# Patient Record
Sex: FEMALE | Race: WHITE | Employment: OTHER | URBAN - METROPOLITAN AREA
[De-identification: names, ages, dates, MRNs, and addresses within clinical notes are randomized per-mention and may not be internally consistent; named-entity substitution may affect disease eponyms.]

---

## 2017-04-11 ENCOUNTER — OFFICE VISIT (OUTPATIENT)
Dept: OBGYN CLINIC | Facility: CLINIC | Age: 22
End: 2017-04-11

## 2017-04-11 VITALS
RESPIRATION RATE: 16 BRPM | HEART RATE: 88 BPM | WEIGHT: 114 LBS | HEIGHT: 63 IN | BODY MASS INDEX: 20.2 KG/M2 | DIASTOLIC BLOOD PRESSURE: 74 MMHG | SYSTOLIC BLOOD PRESSURE: 108 MMHG

## 2017-04-11 DIAGNOSIS — N76.0 VAGINITIS AND VULVOVAGINITIS: Primary | ICD-10-CM

## 2017-04-11 DIAGNOSIS — Z11.3 SCREEN FOR STD (SEXUALLY TRANSMITTED DISEASE): ICD-10-CM

## 2017-04-11 DIAGNOSIS — Z30.09 EVALUATION REGARDING CONTRACEPTION OPTIONS: ICD-10-CM

## 2017-04-11 PROCEDURE — 87591 N.GONORRHOEAE DNA AMP PROB: CPT | Performed by: OBSTETRICS & GYNECOLOGY

## 2017-04-11 PROCEDURE — 99203 OFFICE O/P NEW LOW 30 MIN: CPT | Performed by: OBSTETRICS & GYNECOLOGY

## 2017-04-11 PROCEDURE — 87660 TRICHOMONAS VAGIN DIR PROBE: CPT | Performed by: OBSTETRICS & GYNECOLOGY

## 2017-04-11 PROCEDURE — 87510 GARDNER VAG DNA DIR PROBE: CPT | Performed by: OBSTETRICS & GYNECOLOGY

## 2017-04-11 PROCEDURE — 87491 CHLMYD TRACH DNA AMP PROBE: CPT | Performed by: OBSTETRICS & GYNECOLOGY

## 2017-04-11 PROCEDURE — 87480 CANDIDA DNA DIR PROBE: CPT | Performed by: OBSTETRICS & GYNECOLOGY

## 2017-04-11 RX ORDER — METRONIDAZOLE 10 MG/G
GEL TOPICAL DAILY
COMMUNITY
End: 2017-08-25 | Stop reason: ALTCHOICE

## 2017-04-11 NOTE — PROGRESS NOTES
Fatou Saleh is a 22year old female  Patient's last menstrual period was 04/10/2017 (exact date). Patient presents with:  Vaginal Problem: new patient. Reoccuring discharge/vaginal bacteria. Burning and pain/redness.    .   Tx,s with metrogel and cyanosis  Psychiatric:  Oriented to time, place, person and situation.  Appropriate mood and affect    Pelvic Exam:  External Genitalia: normal appearance, hair distribution, and no lesions  Urethral Meatus:  normal in size, location, without lesions and pr

## 2017-04-13 RX ORDER — METRONIDAZOLE 7.5 MG/G
1 GEL VAGINAL NIGHTLY
Qty: 70 G | Refills: 0 | Status: SHIPPED | OUTPATIENT
Start: 2017-04-13 | End: 2017-04-18

## 2017-04-13 NOTE — TELEPHONE ENCOUNTER
----- Message from Tremaine Salinas MD sent at 4/13/2017 11:15 AM CDT -----  Results reviewed.  Please inform patient  metrogel or flagyl

## 2017-04-20 ENCOUNTER — OFFICE VISIT (OUTPATIENT)
Dept: FAMILY MEDICINE CLINIC | Facility: CLINIC | Age: 22
End: 2017-04-20

## 2017-04-20 ENCOUNTER — LAB ENCOUNTER (OUTPATIENT)
Dept: LAB | Age: 22
End: 2017-04-20
Attending: EMERGENCY MEDICINE
Payer: COMMERCIAL

## 2017-04-20 VITALS
RESPIRATION RATE: 14 BRPM | HEIGHT: 62.5 IN | DIASTOLIC BLOOD PRESSURE: 78 MMHG | TEMPERATURE: 99 F | SYSTOLIC BLOOD PRESSURE: 102 MMHG | OXYGEN SATURATION: 99 % | HEART RATE: 92 BPM | BODY MASS INDEX: 19.56 KG/M2 | WEIGHT: 109 LBS

## 2017-04-20 DIAGNOSIS — D64.9 ANEMIA, UNSPECIFIED TYPE: ICD-10-CM

## 2017-04-20 DIAGNOSIS — Z13.228 SCREENING FOR ENDOCRINE, NUTRITIONAL, METABOLIC AND IMMUNITY DISORDER: ICD-10-CM

## 2017-04-20 DIAGNOSIS — Z13.21 SCREENING FOR ENDOCRINE, NUTRITIONAL, METABOLIC AND IMMUNITY DISORDER: ICD-10-CM

## 2017-04-20 DIAGNOSIS — Z13.0 SCREENING FOR ENDOCRINE, NUTRITIONAL, METABOLIC AND IMMUNITY DISORDER: ICD-10-CM

## 2017-04-20 DIAGNOSIS — Z13.29 SCREENING FOR ENDOCRINE, NUTRITIONAL, METABOLIC AND IMMUNITY DISORDER: ICD-10-CM

## 2017-04-20 DIAGNOSIS — J02.0 STREP PHARYNGITIS: Primary | ICD-10-CM

## 2017-04-20 PROCEDURE — 80053 COMPREHEN METABOLIC PANEL: CPT

## 2017-04-20 PROCEDURE — 36415 COLL VENOUS BLD VENIPUNCTURE: CPT

## 2017-04-20 PROCEDURE — 82306 VITAMIN D 25 HYDROXY: CPT

## 2017-04-20 PROCEDURE — 87880 STREP A ASSAY W/OPTIC: CPT | Performed by: EMERGENCY MEDICINE

## 2017-04-20 PROCEDURE — 83540 ASSAY OF IRON: CPT

## 2017-04-20 PROCEDURE — 80061 LIPID PANEL: CPT

## 2017-04-20 PROCEDURE — 99203 OFFICE O/P NEW LOW 30 MIN: CPT | Performed by: EMERGENCY MEDICINE

## 2017-04-20 PROCEDURE — 84443 ASSAY THYROID STIM HORMONE: CPT

## 2017-04-20 PROCEDURE — 82728 ASSAY OF FERRITIN: CPT

## 2017-04-20 PROCEDURE — 83550 IRON BINDING TEST: CPT

## 2017-04-20 PROCEDURE — 85025 COMPLETE CBC W/AUTO DIFF WBC: CPT

## 2017-04-20 RX ORDER — AMOXICILLIN 875 MG/1
875 TABLET, COATED ORAL 2 TIMES DAILY
Qty: 20 TABLET | Refills: 0 | Status: SHIPPED | OUTPATIENT
Start: 2017-04-20 | End: 2017-04-30

## 2017-04-20 NOTE — PROGRESS NOTES
Patient presents with:  Sinus Problem: Sinus pressure/congestion, body aches, weakness, fatigue, sore throat, productive cough, and bilateral ear pain. x10 days   Diabetes: Pt would also like to discuss family hx dm.  Pt c/o ongoing frequent urination, freq inspiratory and expiratory effort    MDM/Assessment/Plan:   Orders for this encounter:      Orders Placed This Encounter  CBC With Diff  CMP  Lipid  TSH W Reflex To Free T4  Vitamin D, 25-Hydroxy  Rapid Strep  amoxicillin 875 MG Oral Tab   Sig: Take 1 tabl

## 2017-04-20 NOTE — PATIENT INSTRUCTIONS
Thank you for choosing [facility]  To Do:  FOR SHANIA RAMOS  Salt water Gargles. Soft diet. Push fluids and rest. Throw away toothbrush after 2-3 days of antibiotics.   He may take over-the-counter Tylenol or Motrin for fever body aches and ch okay. Avoid salty or spicy foods. Follow-up care  Follow up with your healthcare provider or our staff if you are not improving over the next week.   When to seek medical advice  Call your healthcare provider right away if any of these occur:  · Fever as d

## 2017-04-21 PROBLEM — D64.9 ANEMIA: Status: ACTIVE | Noted: 2017-04-21

## 2017-04-25 ENCOUNTER — TELEPHONE (OUTPATIENT)
Dept: FAMILY MEDICINE CLINIC | Facility: CLINIC | Age: 22
End: 2017-04-25

## 2017-04-25 NOTE — TELEPHONE ENCOUNTER
----- Message from Fernando Regan MD sent at 4/21/2017 12:33 PM CDT -----  + anemia will reflex TIBC and Ferritin      Low vitamin D, needs vitamin D 50,000 units q weekly #4 with 2 refills then, OTC VIT D 2761-4045 units q daily maintenance   Repeat Vi

## 2017-04-25 NOTE — TELEPHONE ENCOUNTER
----- Message from Sarah Damon MD sent at 4/24/2017 10:17 AM CDT -----  + iron deficiency anemia  Labs consistent with iron deficiency anemia. Pls advise patient to take OTC Ferrous sulfate 325 mg 2-3 times a day.  Anticipate constipation and stomach

## 2017-05-05 ENCOUNTER — OFFICE VISIT (OUTPATIENT)
Dept: FAMILY MEDICINE CLINIC | Facility: CLINIC | Age: 22
End: 2017-05-05

## 2017-05-05 VITALS
RESPIRATION RATE: 16 BRPM | BODY MASS INDEX: 19.56 KG/M2 | TEMPERATURE: 99 F | DIASTOLIC BLOOD PRESSURE: 70 MMHG | OXYGEN SATURATION: 98 % | HEART RATE: 101 BPM | SYSTOLIC BLOOD PRESSURE: 120 MMHG | HEIGHT: 62.5 IN | WEIGHT: 109 LBS

## 2017-05-05 DIAGNOSIS — R10.31 RLQ ABDOMINAL PAIN: Primary | ICD-10-CM

## 2017-05-05 DIAGNOSIS — N30.01 ACUTE CYSTITIS WITH HEMATURIA: ICD-10-CM

## 2017-05-05 DIAGNOSIS — D50.9 IRON DEFICIENCY ANEMIA, UNSPECIFIED IRON DEFICIENCY ANEMIA TYPE: ICD-10-CM

## 2017-05-05 DIAGNOSIS — N90.9 LESION OF FEMALE PERINEUM: ICD-10-CM

## 2017-05-05 DIAGNOSIS — R10.2 VAGINAL PAIN: ICD-10-CM

## 2017-05-05 DIAGNOSIS — E55.9 VITAMIN D DEFICIENCY: ICD-10-CM

## 2017-05-05 PROCEDURE — 81025 URINE PREGNANCY TEST: CPT | Performed by: EMERGENCY MEDICINE

## 2017-05-05 PROCEDURE — 87529 HSV DNA AMP PROBE: CPT | Performed by: EMERGENCY MEDICINE

## 2017-05-05 PROCEDURE — 87086 URINE CULTURE/COLONY COUNT: CPT | Performed by: EMERGENCY MEDICINE

## 2017-05-05 PROCEDURE — 99214 OFFICE O/P EST MOD 30 MIN: CPT | Performed by: EMERGENCY MEDICINE

## 2017-05-05 PROCEDURE — 87186 SC STD MICRODIL/AGAR DIL: CPT | Performed by: EMERGENCY MEDICINE

## 2017-05-05 PROCEDURE — 81003 URINALYSIS AUTO W/O SCOPE: CPT | Performed by: EMERGENCY MEDICINE

## 2017-05-05 PROCEDURE — 87077 CULTURE AEROBIC IDENTIFY: CPT | Performed by: EMERGENCY MEDICINE

## 2017-05-05 RX ORDER — VALACYCLOVIR HYDROCHLORIDE 1 G/1
1 TABLET, FILM COATED ORAL 3 TIMES DAILY
Qty: 21 TABLET | Refills: 0 | Status: SHIPPED | OUTPATIENT
Start: 2017-05-05 | End: 2017-05-12

## 2017-05-05 RX ORDER — CIPROFLOXACIN 500 MG/1
500 TABLET, FILM COATED ORAL 2 TIMES DAILY
Qty: 14 TABLET | Refills: 0 | Status: SHIPPED | OUTPATIENT
Start: 2017-05-05 | End: 2017-05-12

## 2017-05-05 RX ORDER — ERGOCALCIFEROL 1.25 MG/1
50000 CAPSULE ORAL WEEKLY
Qty: 12 CAPSULE | Refills: 0 | Status: SHIPPED | OUTPATIENT
Start: 2017-05-05 | End: 2017-06-04

## 2017-05-05 NOTE — PATIENT INSTRUCTIONS
Thank you for choosing [facility]  To Do:  FOR SHANIA Nance  1. Continue with Metrogel for now  2. Take antibiotic, Ciprofloxacin as directed  3. You are anemic, take OTC Ferrous Sulfate 2-3 times a day  4.  Take high dose vit D 5000 high altitudes. Follow-up care  Follow up with your healthcare provider in 2 months, or as advised. This is to have another red blood cell count to be sure your anemia has been fixed.   When to seek medical advice  Call your healthcare provider right away  thin the blood and may increase bleeding. Follow-up care  Follow up with your healthcare provider, or as advised. If you were injured and had blood in your urine, you should have a repeat urine test in 1 to 2 days.  Contact your doctor for this test.  A ra

## 2017-05-05 NOTE — PROGRESS NOTES
Chief Complaint:   Patient presents with:  Vaginal Discharge: bv for over a year, blood in urine on tuesday, vaginal pain, appendix pain, lab results    HPI:   This is a 25year old female     BACTERIAL VAGINOSIS  Been treated repeatedly for BV.  Treated wi the following:    Height as of this encounter: 62.5\". Weight as of this encounter: 109 lb. Vital signs reviewed. Appears stated age, well groomed.   GENERAL: well developed, well nourished, well hydrated, no distress  SKIN: good skin turgor, no obvious [id number] Numeric   Multistix Expiration Date 3/31/2018 Date       ASSESSMENT AND PLAN:   Diagnoses and all orders for this visit:    RLQ abdominal pain  -     Urine Preg Test  -     US PELVIS EV (TRNS ABD AND ENDOVAG) ([id number]);  Future  -     US APPE

## 2017-05-06 ENCOUNTER — HOSPITAL ENCOUNTER (OUTPATIENT)
Dept: ULTRASOUND IMAGING | Age: 22
Discharge: HOME | End: 2017-05-06
Attending: EMERGENCY MEDICINE
Payer: COMMERCIAL

## 2017-05-06 DIAGNOSIS — R10.31 RLQ ABDOMINAL PAIN: ICD-10-CM

## 2017-05-06 DIAGNOSIS — N30.01 ACUTE CYSTITIS WITH HEMATURIA: ICD-10-CM

## 2017-05-06 PROCEDURE — 76856 US EXAM PELVIC COMPLETE: CPT | Performed by: EMERGENCY MEDICINE

## 2017-05-06 PROCEDURE — 76705 ECHO EXAM OF ABDOMEN: CPT | Performed by: EMERGENCY MEDICINE

## 2017-05-06 PROCEDURE — 76830 TRANSVAGINAL US NON-OB: CPT | Performed by: EMERGENCY MEDICINE

## 2017-05-08 ENCOUNTER — TELEPHONE (OUTPATIENT)
Dept: FAMILY MEDICINE CLINIC | Facility: CLINIC | Age: 22
End: 2017-05-08

## 2017-05-08 NOTE — TELEPHONE ENCOUNTER
I [id number] (M) and discussed results and recommendations at length with patient. Pt states the pain in the right lower quadrant in the same. Pain is 3\" from the belly button. It came and went over the weekend. Pain is a 6 1/2-8 1/2.   Pt state

## 2017-05-08 NOTE — TELEPHONE ENCOUNTER
----- Message from Manny Escobar MD sent at 5/6/2017  4:16 PM CDT -----  Appendix not identified  pls call pt for condition update

## 2017-05-08 NOTE — TELEPHONE ENCOUNTER
Patient returned call she also needs a note for work patient can not take calls during the day she is hoping to get her results when she comes to pick the note up at 6:30.  Leave detailed message on VM

## 2017-05-10 ENCOUNTER — TELEPHONE (OUTPATIENT)
Dept: FAMILY MEDICINE CLINIC | Facility: CLINIC | Age: 22
End: 2017-05-10

## 2017-05-10 NOTE — TELEPHONE ENCOUNTER
Called patient and spoke with her. Advised patient of results and POC below. Patient states understanding. Please advise if patient should continue Valtrex since HSV is negative. Thank you!     Triage: patient is going to continue with valtrex until oth

## 2017-05-10 NOTE — TELEPHONE ENCOUNTER
----- Message from Patricia Yi MD sent at 5/9/2017 11:51 AM CDT -----  NO evidence for HSV    + UTI sensitive to Cipro, continue with Cipro

## 2017-05-11 NOTE — TELEPHONE ENCOUNTER
Called patient and spoke with her. Advised patient to continue with her valtrex per below. All questions answered for patient and she expressed understanding.

## 2017-08-25 ENCOUNTER — OFFICE VISIT (OUTPATIENT)
Dept: FAMILY MEDICINE CLINIC | Facility: CLINIC | Age: 22
End: 2017-08-25

## 2017-08-25 VITALS
WEIGHT: 109 LBS | HEIGHT: 61.5 IN | BODY MASS INDEX: 20.32 KG/M2 | RESPIRATION RATE: 16 BRPM | TEMPERATURE: 99 F | OXYGEN SATURATION: 99 % | SYSTOLIC BLOOD PRESSURE: 112 MMHG | HEART RATE: 82 BPM | DIASTOLIC BLOOD PRESSURE: 80 MMHG

## 2017-08-25 DIAGNOSIS — J02.9 SORE THROAT: ICD-10-CM

## 2017-08-25 DIAGNOSIS — J01.10 ACUTE NON-RECURRENT FRONTAL SINUSITIS: Primary | ICD-10-CM

## 2017-08-25 LAB — CONTROL LINE PRESENT WITH A CLEAR BACKGROUND (YES/NO): YES YES/NO

## 2017-08-25 PROCEDURE — 87081 CULTURE SCREEN ONLY: CPT | Performed by: EMERGENCY MEDICINE

## 2017-08-25 PROCEDURE — 87880 STREP A ASSAY W/OPTIC: CPT | Performed by: EMERGENCY MEDICINE

## 2017-08-25 PROCEDURE — 99214 OFFICE O/P EST MOD 30 MIN: CPT | Performed by: EMERGENCY MEDICINE

## 2017-08-25 RX ORDER — AMOXICILLIN 875 MG/1
875 TABLET, COATED ORAL 2 TIMES DAILY
Qty: 20 TABLET | Refills: 0 | Status: SHIPPED | OUTPATIENT
Start: 2017-08-25 | End: 2017-09-04

## 2017-08-25 RX ORDER — FLUTICASONE PROPIONATE 50 MCG
2 SPRAY, SUSPENSION (ML) NASAL DAILY
Qty: 3 BOTTLE | Refills: 3 | Status: SHIPPED | OUTPATIENT
Start: 2017-08-25 | End: 2018-08-20

## 2017-08-25 NOTE — PROGRESS NOTES
Chief Complaint:   Patient presents with:  Sore Throat: sinus pressure/congestion, bilateral ear pain, sore throat x2 days     HPI:   This is a 25year old female     C/O Sore throat and congestion for the past 2 days. + frontal headache and fatigue.  Still voice.  SKIN: good skin turgor, no obvious rashes  HEENT: atraumatic, normocephalic, ears, nose and throat are clear bilateral maxillary frontal sinuses tender to palpation. Nasal turbinates edematous and boggy with clear nasal discharge.   EYES: sclera no

## 2017-08-25 NOTE — PATIENT INSTRUCTIONS
Thank you for choosing [address] [place] [address] [facility]  To Do:  FOR SHANIA An  1. Ff up in 2 weeks for annual physical and PAP  2. Take antibiotics s directed  3. Nasal rinses with OTC Netti pot   4.  May continue with OTC Cough supressants as needed help. (NOTE: Persons with high blood pressure should not use decongestants.  They can raise blood pressure.)  · Over-the-counter antihistamines may help if allergies contributed to your sinusitis.    · Do not use nasal rinses or irrigation during an acute s can irritate the mucosa. It can then swell up. As a response to irritation, the mucosa makes more mucus and other fluids. Tiny hairlike cilia cover the mucosa. Cilia help carry mucus toward the opening of the sinus.  Too much mucus may cause the cilia to st

## 2017-11-02 ENCOUNTER — APPOINTMENT (OUTPATIENT)
Dept: LAB | Age: 22
End: 2017-11-02
Attending: EMERGENCY MEDICINE
Payer: COMMERCIAL

## 2017-11-02 ENCOUNTER — OFFICE VISIT (OUTPATIENT)
Dept: FAMILY MEDICINE CLINIC | Facility: CLINIC | Age: 22
End: 2017-11-02

## 2017-11-02 VITALS
DIASTOLIC BLOOD PRESSURE: 66 MMHG | BODY MASS INDEX: 19.67 KG/M2 | RESPIRATION RATE: 16 BRPM | HEIGHT: 63 IN | WEIGHT: 111 LBS | OXYGEN SATURATION: 98 % | TEMPERATURE: 99 F | SYSTOLIC BLOOD PRESSURE: 104 MMHG | HEART RATE: 90 BPM

## 2017-11-02 DIAGNOSIS — J02.9 SORE THROAT: ICD-10-CM

## 2017-11-02 DIAGNOSIS — Z20.2 STD EXPOSURE: ICD-10-CM

## 2017-11-02 DIAGNOSIS — Z20.2 STD EXPOSURE: Primary | ICD-10-CM

## 2017-11-02 PROCEDURE — 87591 N.GONORRHOEAE DNA AMP PROB: CPT | Performed by: EMERGENCY MEDICINE

## 2017-11-02 PROCEDURE — 87086 URINE CULTURE/COLONY COUNT: CPT | Performed by: EMERGENCY MEDICINE

## 2017-11-02 PROCEDURE — 99214 OFFICE O/P EST MOD 30 MIN: CPT | Performed by: EMERGENCY MEDICINE

## 2017-11-02 PROCEDURE — 87081 CULTURE SCREEN ONLY: CPT | Performed by: EMERGENCY MEDICINE

## 2017-11-02 PROCEDURE — 86803 HEPATITIS C AB TEST: CPT

## 2017-11-02 PROCEDURE — 87491 CHLMYD TRACH DNA AMP PROBE: CPT | Performed by: EMERGENCY MEDICINE

## 2017-11-02 PROCEDURE — 86780 TREPONEMA PALLIDUM: CPT

## 2017-11-02 PROCEDURE — 87340 HEPATITIS B SURFACE AG IA: CPT

## 2017-11-02 PROCEDURE — 36415 COLL VENOUS BLD VENIPUNCTURE: CPT

## 2017-11-02 PROCEDURE — 86706 HEP B SURFACE ANTIBODY: CPT

## 2017-11-02 PROCEDURE — 87389 HIV-1 AG W/HIV-1&-2 AB AG IA: CPT

## 2017-11-02 PROCEDURE — 81025 URINE PREGNANCY TEST: CPT | Performed by: EMERGENCY MEDICINE

## 2017-11-02 PROCEDURE — 81003 URINALYSIS AUTO W/O SCOPE: CPT | Performed by: EMERGENCY MEDICINE

## 2017-11-02 RX ORDER — AZITHROMYCIN 500 MG/1
1000 TABLET, FILM COATED ORAL ONCE
Qty: 2 TABLET | Refills: 0 | Status: SHIPPED | OUTPATIENT
Start: 2017-11-02 | End: 2017-11-02

## 2017-11-02 NOTE — PROGRESS NOTES
Chief Complaint:   Patient presents with:  Chlamydia: Exposure to chlamydia    HPI:   This is a 25year old female       STD EXPOSURE  Recent partner has told her that he was recently diagnosed with chlamydia. Boyfriend treated.  No history of STD in the pa encounter: 111 lb. Vital signs reviewed. Appears stated age, well groomed.   GENERAL: well developed, well nourished, well hydrated, no distress  SKIN: good skin turgor, no obvious rashes  HEENT: atraumatic, normocephalic, ears, nose and throat are clear t ANTIBODY; Future  -     URINE CULTURE, ROUTINE; Future  -     CHLAMYDIA/GONOCOCCUS, RODOLFO; Future    Sore throat  Most likely URI. Advised supportive management. Most likely also secondary to postnasal drip. Advised to continue with Flonase nasal spray.   Kathy Lofton

## 2017-11-02 NOTE — PATIENT INSTRUCTIONS
Thank you for choosing [facility]  To Do:  FOR SHANIA Lujan  1. Continue with Flonase nose spray  2. MAy take an over the counter decongestant as needed  3. Follow up as neded  4. Take Azithromycin for chlamydia exposure  5.  Safe se provider may also order other tests because chlamydia symptoms can be confused with symptoms of other STDs. These STDs include:  · Gonorrhea   · HIV/AIDS  · Hepatitis B  · Trichomoniasis  · Syphilis  What do my test results mean?   Many things may affect yo you’re straight or noble, male or female, young or old. Any person who has sex can get an STD. Your risk increases if:  · You have more than one partner. The more partners you have, the greater your risk. · Your partner has other partners.  If your partner i rectum  Even if you don’t have symptoms  You may have an STD, even if you don’t have symptoms. If you think you are at risk, get checked. Go to a clinic or to your healthcare provider.  If your partner has an STD, you need to be tested too, even if you feel the STD first. So try not to place blame. Your healthcare provider may offer some advice on how to begin. Prevent future problems  Even after [facility] been treated, you can still be infected again. This is a common problem.  It happens when a partner passes sex. And choose your partner wisely. Use condoms for safer sex  If you have sex, latex condoms provide the best protection against STDs. Latex condoms stop the exchange of body fluids that carry certain STDs. They also limit contact with affected skin.  Be the past, and how many you have now. Find out if either of you has an STD. If you decide to have sex, use a condom each time.  Don’t stop using condoms unless you’re sure neither of you has other partners and Chris both been tested to confirm you don’t hav

## 2017-11-06 ENCOUNTER — TELEPHONE (OUTPATIENT)
Dept: FAMILY MEDICINE CLINIC | Facility: CLINIC | Age: 22
End: 2017-11-06

## 2017-11-06 NOTE — TELEPHONE ENCOUNTER
----- Message from Lowell Reese MD sent at 11/6/2017 12:01 PM CST -----  STD screening all neg  Pt is Hep B NON IMMUNE  Pls offer Hep B series if she wishes.

## 2017-11-07 NOTE — TELEPHONE ENCOUNTER
Pt notified of lab results & below orders. Pt notified of + Chlamydia & to take Azithromycin Rx that was given at [facility]. She states her partner has been treated already.  She will call office back to schedule nurse visit if she would like to get Hep B vacci

## 2017-11-07 NOTE — TELEPHONE ENCOUNTER
----- Message from Delroy Wells MD sent at 11/7/2017  1:57 PM CST -----  pls inform pt of + Chlamydia  Partner should be treated too  Pt Rx'd azithromycin from office

## 2018-07-27 ENCOUNTER — OFFICE VISIT (OUTPATIENT)
Dept: FAMILY MEDICINE CLINIC | Facility: CLINIC | Age: 23
End: 2018-07-27
Payer: COMMERCIAL

## 2018-07-27 VITALS
TEMPERATURE: 99 F | DIASTOLIC BLOOD PRESSURE: 74 MMHG | WEIGHT: 122 LBS | HEART RATE: 110 BPM | HEIGHT: 63 IN | SYSTOLIC BLOOD PRESSURE: 118 MMHG | RESPIRATION RATE: 16 BRPM | OXYGEN SATURATION: 99 % | BODY MASS INDEX: 21.62 KG/M2

## 2018-07-27 DIAGNOSIS — J06.9 VIRAL URI: Primary | ICD-10-CM

## 2018-07-27 PROCEDURE — 99213 OFFICE O/P EST LOW 20 MIN: CPT | Performed by: PHYSICIAN ASSISTANT

## 2018-07-27 NOTE — PROGRESS NOTES
CHIEF COMPLAINT:   Patient presents with:  Nasal Congestion    HPI:   Amita Conteh is a 26year old female who presents for upper and lower respiratory symptoms for  4 days.  Patient reports sinus pressure, swollen glands, PND, scratchy/ sore THROAT: Oral mucosa pink, moist. Posterior pharynx is mildly injected. No exudates. LUNGS: clear to auscultation bilaterally, no wheezes or rhonchi. Breathing is non labored. CARDIO: RRR without murmur. LYMPH: No lymphadenopathy.       ASSESSMENT AND PL · Your appetite may be poor, so a light diet is fine. Avoid dehydration by drinking 6 to 8 glasses of fluids per day (water, soft drinks, juices, tea, or soup). Extra fluids will help loosen secretions in the nose and lungs.   · Over-the-counter cold medici

## 2018-07-30 ENCOUNTER — TELEPHONE (OUTPATIENT)
Dept: FAMILY MEDICINE CLINIC | Facility: CLINIC | Age: 23
End: 2018-07-30

## 2018-07-30 RX ORDER — FLUTICASONE PROPIONATE 50 MCG
2 SPRAY, SUSPENSION (ML) NASAL DAILY
Qty: 1 BOTTLE | Refills: 0 | Status: SHIPPED | OUTPATIENT
Start: 2018-07-30 | End: 2019-01-14

## 2018-07-30 NOTE — TELEPHONE ENCOUNTER
Received call from pt. She states when she was seen at the [address] [place] [address] last week she told the provider she was out of her flonase. Provider stated she would send a prescription to her pharmacy. Pt states pharmacy never received prescription.  Reviewed note by [facility]

## 2018-11-06 ENCOUNTER — NURSE ONLY (OUTPATIENT)
Dept: FAMILY MEDICINE CLINIC | Facility: CLINIC | Age: 23
End: 2018-11-06
Payer: COMMERCIAL

## 2018-11-06 VITALS
RESPIRATION RATE: 18 BRPM | OXYGEN SATURATION: 98 % | HEART RATE: 82 BPM | HEIGHT: 63 IN | BODY MASS INDEX: 21.26 KG/M2 | DIASTOLIC BLOOD PRESSURE: 62 MMHG | TEMPERATURE: 98 F | SYSTOLIC BLOOD PRESSURE: 118 MMHG | WEIGHT: 120 LBS

## 2018-11-06 DIAGNOSIS — J30.9 ALLERGIC RHINITIS, UNSPECIFIED SEASONALITY, UNSPECIFIED TRIGGER: ICD-10-CM

## 2018-11-06 DIAGNOSIS — A08.4 VIRAL GASTROENTERITIS: Primary | ICD-10-CM

## 2018-11-06 PROCEDURE — 99213 OFFICE O/P EST LOW 20 MIN: CPT | Performed by: NURSE PRACTITIONER

## 2018-11-06 NOTE — PATIENT INSTRUCTIONS
Gatorade, fluids  Brat diet and simple foods  Rest  Flonase as packet insert  Zyrtec as packet insert     Follow-up if not improving       Viral Gastroenteritis (Adult)    Gastroenteritis is commonly called the stomach flu.  It is most often caused by a vir You may use acetaminophen or NSAID medicines like ibuprofen or naproxen to control fever unless another medicine was given. If you have chronic liver or kidney disease, talk with your healthcare provider before using these medicines.  Also talk with your pr · Limit fat intake to less than 15 grams per day. Do this by avoiding margarine, butter, oils, mayonnaise, sauces, gravies, fried foods, peanut butter, meat, poultry, and fish.   · Limit fiber and avoid raw or cooked vegetables, fresh fruits (except bananas Nasal allergies (also called allergic rhinitis) are a common health problem. They may be seasonal. This means they cause symptoms only at certain times of the year. Or they may be perennial. This means they cause symptoms all year long.  Other health proble Your healthcare provider will evaluate you to find the cause of your symptoms then recommend treatment. If your symptoms are due to nasal allergies, your healthcare provider may prescribe nasal steroid sprays or oral antihistamines to help reduce symptoms.

## 2018-11-06 NOTE — PROGRESS NOTES
CHIEF COMPLAINT:   Patient presents with:  Cough: cough at night, X 1 week, post nasal drip, X 2 months   Diarrhea: diarrhea, stomach pain X 2 days        HPI:   María Davis is a 26year old female who presents for cough for 1 week and post /62   Pulse 82   Temp 98.2 °F (36.8 °C) (Oral)   Resp 18   Ht 63\"   Wt 120 lb   LMP 10/16/2018   SpO2 98%   BMI 21.26 kg/m²   GENERAL: well developed, well nourished,in no apparent distress  SKIN: no rashes,no suspicious lesions  HEAD: atraumatic, n The patient is asked to f/u with PCP in one week if sx's persist or worsen.           *Meds & Refills for this Visit:  Requested Prescriptions      No prescriptions requested or ordered in this encounter           Patient Instructions     Gatorade, fluids · People with diarrhea should not prepare or serve food to others. When preparing foods, wash your hands before and after. · Wash your hands after using cutting boards, countertops, knives, or utensils that have been in contact with raw food.   · Keep unco · Desserts. Eat gelatin, popsicles, and fruit juice bars.   During the next 24 hours (the second day), you may add the following to the above:  · Hot cereal, plain toast, bread, rolls, and crackers  · Plain noodles, rice, mashed potatoes, chicken noodle or Date Last Reviewed: 1/3/2016  © 0805-8600 [employer] [address]. [address] [facility], [address] [place]. All rights reserved. This information is not intended as a substitute for professional medical care.  Always follow your healthcare professional' · Headache  It may not be allergies  Other health problems can cause symptoms like those of nasal allergies.  These include:  · Nonallergic rhinitis and viruses such as colds  · Irritants and pollutants, such as strong odors or smoke  · Certain medicines  ·

## 2019-01-14 ENCOUNTER — OFFICE VISIT (OUTPATIENT)
Dept: FAMILY MEDICINE CLINIC | Facility: CLINIC | Age: 24
End: 2019-01-14
Payer: COMMERCIAL

## 2019-01-14 VITALS
DIASTOLIC BLOOD PRESSURE: 70 MMHG | SYSTOLIC BLOOD PRESSURE: 120 MMHG | WEIGHT: 117 LBS | RESPIRATION RATE: 16 BRPM | HEART RATE: 92 BPM | TEMPERATURE: 99 F | HEIGHT: 63 IN | OXYGEN SATURATION: 98 % | BODY MASS INDEX: 20.73 KG/M2

## 2019-01-14 DIAGNOSIS — J01.11 ACUTE RECURRENT FRONTAL SINUSITIS: Primary | ICD-10-CM

## 2019-01-14 PROCEDURE — 99213 OFFICE O/P EST LOW 20 MIN: CPT | Performed by: NURSE PRACTITIONER

## 2019-01-14 RX ORDER — MONTELUKAST SODIUM 10 MG/1
10 TABLET ORAL NIGHTLY
Qty: 30 TABLET | Refills: 2 | Status: SHIPPED | OUTPATIENT
Start: 2019-01-14 | End: 2019-02-13

## 2019-01-14 RX ORDER — FLUTICASONE PROPIONATE 50 MCG
2 SPRAY, SUSPENSION (ML) NASAL DAILY
Qty: 3 BOTTLE | Refills: 3 | Status: SHIPPED | OUTPATIENT
Start: 2019-01-14 | End: 2020-01-09

## 2019-01-14 RX ORDER — DOXYCYCLINE HYCLATE 100 MG
100 TABLET ORAL 2 TIMES DAILY
Qty: 14 TABLET | Refills: 0 | Status: SHIPPED | OUTPATIENT
Start: 2019-01-14 | End: 2019-01-21

## 2019-01-14 RX ORDER — PREDNISONE 20 MG/1
40 TABLET ORAL DAILY
Qty: 10 TABLET | Refills: 0 | Status: SHIPPED | OUTPATIENT
Start: 2019-01-14 | End: 2019-01-19

## 2019-01-14 NOTE — PROGRESS NOTES
Caden Jade is a 26year old female. Patient presents with:  Cold: sore throat, sinus headaches x 1 weeks    HPI:    Sinus: Patient is 26 y female presents with sinus. Reports symptoms started  2 weeks ago .   Reports clogged  Ears, with Propionate 50 MCG/ACT Nasal Suspension; 2 sprays by Each Nare route daily. -     predniSONE 20 MG Oral Tab; Take 2 tablets (40 mg total) by mouth daily for 5 days. Increase fluids, rest, Tylenol or Ibuprofen prn, f/u in 5 days if not better.

## 2019-03-25 ENCOUNTER — OFFICE VISIT (OUTPATIENT)
Dept: FAMILY MEDICINE CLINIC | Facility: CLINIC | Age: 24
End: 2019-03-25
Payer: COMMERCIAL

## 2019-03-25 VITALS
HEART RATE: 76 BPM | RESPIRATION RATE: 16 BRPM | WEIGHT: 115.19 LBS | DIASTOLIC BLOOD PRESSURE: 72 MMHG | OXYGEN SATURATION: 99 % | HEIGHT: 63 IN | BODY MASS INDEX: 20.41 KG/M2 | SYSTOLIC BLOOD PRESSURE: 104 MMHG | TEMPERATURE: 99 F

## 2019-03-25 DIAGNOSIS — R39.9 UTI SYMPTOMS: Primary | ICD-10-CM

## 2019-03-25 LAB
APPEARANCE: CLEAR
MULTISTIX LOT#: NORMAL NUMERIC
PH, URINE: 5 (ref 4.5–8)
SPECIFIC GRAVITY: 1.03 (ref 1–1.03)
URINE-COLOR: YELLOW
UROBILINOGEN,SEMI-QN: 0.2 MG/DL (ref 0–1.9)

## 2019-03-25 PROCEDURE — 99213 OFFICE O/P EST LOW 20 MIN: CPT | Performed by: NURSE PRACTITIONER

## 2019-03-25 PROCEDURE — 81003 URINALYSIS AUTO W/O SCOPE: CPT | Performed by: NURSE PRACTITIONER

## 2019-03-25 PROCEDURE — 87086 URINE CULTURE/COLONY COUNT: CPT | Performed by: NURSE PRACTITIONER

## 2019-03-25 RX ORDER — NITROFURANTOIN 25; 75 MG/1; MG/1
100 CAPSULE ORAL 2 TIMES DAILY
Qty: 14 CAPSULE | Refills: 0 | Status: SHIPPED | OUTPATIENT
Start: 2019-03-25 | End: 2019-04-01

## 2019-03-25 NOTE — PROGRESS NOTES
CHIEF COMPLAINT:   Patient presents with:  Burning On Urination: x 1 week      HPI:   Arnaldo Mariee is a 26year old female who presents with symptoms of UTI. Complaining of urinary frequency, urgency, and dysuria for last 7 days.  Symptoms h CVAT.    Recent Results (from the past 24 hour(s))   URINALYSIS, AUTO, W/O SCOPE    Collection Time: 03/25/19  2:49 PM   Result Value Ref Range    Glucose Urine neg mg/dL    Bilirubin Urine neg Negative    Ketones, UA neg Negative mg/dL    Spec Gravity 1.0

## 2019-10-01 ENCOUNTER — OFFICE VISIT (OUTPATIENT)
Dept: FAMILY MEDICINE CLINIC | Facility: CLINIC | Age: 24
End: 2019-10-01
Payer: COMMERCIAL

## 2019-10-01 VITALS
HEIGHT: 63 IN | BODY MASS INDEX: 19.84 KG/M2 | OXYGEN SATURATION: 98 % | WEIGHT: 112 LBS | HEART RATE: 80 BPM | RESPIRATION RATE: 20 BRPM | SYSTOLIC BLOOD PRESSURE: 114 MMHG | DIASTOLIC BLOOD PRESSURE: 78 MMHG | TEMPERATURE: 99 F

## 2019-10-01 DIAGNOSIS — J06.9 VIRAL UPPER RESPIRATORY TRACT INFECTION: Primary | ICD-10-CM

## 2019-10-01 PROCEDURE — 99213 OFFICE O/P EST LOW 20 MIN: CPT | Performed by: NURSE PRACTITIONER

## 2019-10-01 NOTE — PROGRESS NOTES
CHIEF COMPLAINT:   Patient presents with:  Nasal Congestion: Headache, sinus pressure, runny nose, coughing up flem, post nasal drip, ears clogged, X 6 days   Note For Work: Needs note for work       HPI:   Ovi Monegasque is a 22year old femal or edema  LYMP: bilateral anterior cervical lymphadenopathy. ASSESSMENT AND PLAN:   Johanna Venegas is a 22year old female who presents with     Mehrdad Josephine was seen today for nasal congestion and note for work.     Diagnoses and all orders for th the year. Symptoms usually include:    *sore throat  *runny nose  *coughing  *sneezing  *headaches  *body aches    Most people recover within about 7-10 days.  However, people with weakened immune systems, asthma, or respiratory conditions may develop víctor into a tissue then throw it away, or cough and sneeze into your upper shirt sleeve, completely covering your mouth and nose. Wash your hands after coughing, sneezing, or blowing your nose.   Disinfect frequently touched surfaces and objects, such as toys a attacks and have been linked to sinus and ear infections. Other viruses that can cause colds include respiratory syncytial virus, human parainfluenza viruses, adenovirus, human coronaviruses, and human metapneumovirus.     Know the Difference between Common

## 2019-10-01 NOTE — PATIENT INSTRUCTIONS
Upper Respiratory Infections  1. Start Acetaminophen (Tylenol) or Ibuprofed (Advil) as directed on package as needed for headache, ear pain, muscle and joint pains, malaise.   2. Over-the-counter Mucimex DM 12 hour extended release or generic Robutussin DM cause colds can spread from infected people to others through the air and close personal contact. You can also get infected through contact with stool (poop) or respiratory secretions from an infected person.  This can happen when you shake hands with someo drink plenty of fluids. Over-the-counter medicines may help ease symptoms but will not make your cold go away any faster. Always read the label and use medications as directed.  Talk to your doctor before giving your child nonprescription cold medicines, si similar symptoms, it can be difficult (or even impossible) to tell the difference between them based on symptoms alone.  In general, flu symptoms are worse than the common cold and can include fever or feeling feverish/chills, cough, sore throat, runny or s

## 2020-01-16 ENCOUNTER — TELEPHONE (OUTPATIENT)
Dept: FAMILY MEDICINE CLINIC | Facility: CLINIC | Age: 25
End: 2020-01-16

## 2020-01-16 ENCOUNTER — OFFICE VISIT (OUTPATIENT)
Dept: FAMILY MEDICINE CLINIC | Facility: CLINIC | Age: 25
End: 2020-01-16
Payer: COMMERCIAL

## 2020-01-16 VITALS
RESPIRATION RATE: 14 BRPM | SYSTOLIC BLOOD PRESSURE: 102 MMHG | DIASTOLIC BLOOD PRESSURE: 78 MMHG | BODY MASS INDEX: 18.95 KG/M2 | HEIGHT: 64 IN | WEIGHT: 111 LBS | OXYGEN SATURATION: 98 % | TEMPERATURE: 98 F | HEART RATE: 76 BPM

## 2020-01-16 DIAGNOSIS — Z00.00 ENCOUNTER FOR ANNUAL PHYSICAL EXAMINATION EXCLUDING GYNECOLOGICAL EXAMINATION IN A PATIENT OLDER THAN 17 YEARS: Primary | ICD-10-CM

## 2020-01-16 DIAGNOSIS — Z00.00 LABORATORY EXAMINATION ORDERED AS PART OF A ROUTINE GENERAL MEDICAL EXAMINATION: ICD-10-CM

## 2020-01-16 DIAGNOSIS — Z23 NEED FOR TUBERCULOSIS VACCINATION: ICD-10-CM

## 2020-01-16 DIAGNOSIS — Z23 NEED FOR TDAP VACCINATION: ICD-10-CM

## 2020-01-16 DIAGNOSIS — Z01.84 IMMUNITY STATUS TESTING: ICD-10-CM

## 2020-01-16 PROBLEM — Z11.3 SCREEN FOR STD (SEXUALLY TRANSMITTED DISEASE): Status: RESOLVED | Noted: 2017-04-11 | Resolved: 2020-01-16

## 2020-01-16 PROBLEM — N76.0 VAGINITIS AND VULVOVAGINITIS: Status: RESOLVED | Noted: 2017-04-11 | Resolved: 2020-01-16

## 2020-01-16 PROBLEM — Z30.09 EVALUATION REGARDING CONTRACEPTION OPTIONS: Status: RESOLVED | Noted: 2017-04-11 | Resolved: 2020-01-16

## 2020-01-16 PROCEDURE — 90471 IMMUNIZATION ADMIN: CPT | Performed by: EMERGENCY MEDICINE

## 2020-01-16 PROCEDURE — 99395 PREV VISIT EST AGE 18-39: CPT | Performed by: EMERGENCY MEDICINE

## 2020-01-16 PROCEDURE — 90715 TDAP VACCINE 7 YRS/> IM: CPT | Performed by: EMERGENCY MEDICINE

## 2020-01-16 PROCEDURE — 86580 TB INTRADERMAL TEST: CPT | Performed by: EMERGENCY MEDICINE

## 2020-01-16 NOTE — TELEPHONE ENCOUNTER
Patient came in for an appointment and dropped off physical form for employer to be completed. Form is pending TB and MMR titers. Form has been placed in DR. Cortez's PENDING folder.

## 2020-01-16 NOTE — PROGRESS NOTES
Caden Jade is a 22year old female who presents for a complete physical exam.   HPI:     Patient presents with:  Physical: Annual physical         Age: 22    1First day of last menstrual period (or first year of         menstruation, if t time you had           a dental check-up? 1 month ago         11.  Please describe any concerns you have:        NONE  Pt requests completion of forms for  credentialing            Wt Readings from Last 3 Encounters:  01/16/20 : 111 lb (50.3 kg)  10 extremities  NEURO: no sensory or motor complaint  HEMATOLOGY: denies hx anemia; denies bruising or excessive bleeding  ENDOCRINE: denies excessive thirst or urination; denies unexpected wt gain or wt loss  ALLERGY/IMM.: denies food or seasonal allergies weight and healthy BMI  Health maintenance. Immunizations reviewed and updated  TDAP updated today. MMRs status unknown will order blood test.  Patient needs confirmation of MMR immunity for completion of paperwork for .   The patient indicates un

## 2020-01-16 NOTE — PATIENT INSTRUCTIONS
Thank you for choosing [facility]  To Do:  FOR SHANIA Howells    · Follow up in 1-2 weeks for PAP  · HAve blood tests done  · Follow up yearly or as needed  ·       Well balanced diet recommended.    Routine exercise recommended most Depression All women in this age group At routine exams   Diabetes mellitus, type 2 Adults with no symptoms who are overweight or obese and have 1 or more other risk factors for diabetes At least every 3 years   Gonorrhea Sexually active women at increased Meningococcal Women at increased risk for infection – talk with your healthcare provider 1 or more doses   Pneumococcal conjugate vaccine (PCV13) and pneumococcal polysaccharide vaccine (PPSV23) Women at increased risk for infection – talk with your health © 1030-2265 [address], [address] [place]. All rights reserved. This information is not intended as a substitute for professional medical care. Always follow your healthcare professional's instructions.

## 2020-01-17 ENCOUNTER — LAB ENCOUNTER (OUTPATIENT)
Dept: LAB | Age: 25
End: 2020-01-17
Attending: EMERGENCY MEDICINE
Payer: COMMERCIAL

## 2020-01-17 DIAGNOSIS — Z01.84 IMMUNITY STATUS TESTING: ICD-10-CM

## 2020-01-17 DIAGNOSIS — Z00.00 LABORATORY EXAMINATION ORDERED AS PART OF A ROUTINE GENERAL MEDICAL EXAMINATION: ICD-10-CM

## 2020-01-17 LAB
ALBUMIN SERPL-MCNC: 4 G/DL (ref 3.4–5)
ALBUMIN/GLOB SERPL: 1.1 {RATIO} (ref 1–2)
ALP LIVER SERPL-CCNC: 55 U/L (ref 52–144)
ALT SERPL-CCNC: 19 U/L (ref 13–56)
ANION GAP SERPL CALC-SCNC: 7 MMOL/L (ref 0–18)
AST SERPL-CCNC: 12 U/L (ref 15–37)
BASOPHILS # BLD AUTO: 0.02 X10(3) UL (ref 0–0.2)
BASOPHILS NFR BLD AUTO: 0.3 %
BILIRUB SERPL-MCNC: 0.8 MG/DL (ref 0.1–2)
BUN BLD-MCNC: 14 MG/DL (ref 7–18)
BUN/CREAT SERPL: 17.9 (ref 10–20)
CALCIUM BLD-MCNC: 8.7 MG/DL (ref 8.5–10.1)
CHLORIDE SERPL-SCNC: 108 MMOL/L (ref 98–112)
CHOLEST SMN-MCNC: 159 MG/DL (ref ?–200)
CO2 SERPL-SCNC: 27 MMOL/L (ref 21–32)
CREAT BLD-MCNC: 0.78 MG/DL (ref 0.55–1.02)
DEPRECATED RDW RBC AUTO: 46.5 FL (ref 35.1–46.3)
EOSINOPHIL # BLD AUTO: 0.12 X10(3) UL (ref 0–0.7)
EOSINOPHIL NFR BLD AUTO: 1.8 %
ERYTHROCYTE [DISTWIDTH] IN BLOOD BY AUTOMATED COUNT: 16.6 % (ref 11–15)
GLOBULIN PLAS-MCNC: 3.5 G/DL (ref 2.8–4.4)
GLUCOSE BLD-MCNC: 75 MG/DL (ref 70–99)
HCT VFR BLD AUTO: 36.6 % (ref 35–48)
HDLC SERPL-MCNC: 60 MG/DL (ref 40–59)
HGB BLD-MCNC: 11.1 G/DL (ref 12–16)
IMM GRANULOCYTES # BLD AUTO: 0.01 X10(3) UL (ref 0–1)
IMM GRANULOCYTES NFR BLD: 0.2 %
LDLC SERPL CALC-MCNC: 84 MG/DL (ref ?–100)
LYMPHOCYTES # BLD AUTO: 2.74 X10(3) UL (ref 1–4)
LYMPHOCYTES NFR BLD AUTO: 41.9 %
M PROTEIN MFR SERPL ELPH: 7.5 G/DL (ref 6.4–8.2)
MCH RBC QN AUTO: 23.6 PG (ref 26–34)
MCHC RBC AUTO-ENTMCNC: 30.3 G/DL (ref 31–37)
MCV RBC AUTO: 77.7 FL (ref 80–100)
MONOCYTES # BLD AUTO: 0.64 X10(3) UL (ref 0.1–1)
MONOCYTES NFR BLD AUTO: 9.8 %
NEUTROPHILS # BLD AUTO: 3.01 X10 (3) UL (ref 1.5–7.7)
NEUTROPHILS # BLD AUTO: 3.01 X10(3) UL (ref 1.5–7.7)
NEUTROPHILS NFR BLD AUTO: 46 %
NONHDLC SERPL-MCNC: 99 MG/DL (ref ?–130)
OSMOLALITY SERPL CALC.SUM OF ELEC: 293 MOSM/KG (ref 275–295)
PATIENT FASTING Y/N/NP: YES
PATIENT FASTING Y/N/NP: YES
PLATELET # BLD AUTO: 265 10(3)UL (ref 150–450)
POTASSIUM SERPL-SCNC: 3.4 MMOL/L (ref 3.5–5.1)
RBC # BLD AUTO: 4.71 X10(6)UL (ref 3.8–5.3)
RUBV IGG SER QL: POSITIVE
RUBV IGG SER-ACNC: 160.7 IU/ML (ref 10–?)
SODIUM SERPL-SCNC: 142 MMOL/L (ref 136–145)
TRIGL SERPL-MCNC: 75 MG/DL (ref 30–149)
TSI SER-ACNC: 2.59 MIU/ML (ref 0.36–3.74)
VLDLC SERPL CALC-MCNC: 15 MG/DL (ref 0–30)
WBC # BLD AUTO: 6.5 X10(3) UL (ref 4–11)

## 2020-01-17 PROCEDURE — 80061 LIPID PANEL: CPT | Performed by: EMERGENCY MEDICINE

## 2020-01-17 PROCEDURE — 80050 GENERAL HEALTH PANEL: CPT | Performed by: EMERGENCY MEDICINE

## 2020-01-17 PROCEDURE — 86762 RUBELLA ANTIBODY: CPT | Performed by: EMERGENCY MEDICINE

## 2020-01-17 PROCEDURE — 86735 MUMPS ANTIBODY: CPT | Performed by: EMERGENCY MEDICINE

## 2020-01-17 PROCEDURE — 86765 RUBEOLA ANTIBODY: CPT | Performed by: EMERGENCY MEDICINE

## 2020-01-17 PROCEDURE — 36415 COLL VENOUS BLD VENIPUNCTURE: CPT | Performed by: EMERGENCY MEDICINE

## 2020-01-20 LAB
MEV IGG SER-ACNC: >300 AU/ML (ref 16.5–?)
MUV IGG SER IA-ACNC: 86.6 AU/ML (ref 11–?)

## 2020-01-22 DIAGNOSIS — D64.9 ANEMIA, UNSPECIFIED TYPE: Primary | ICD-10-CM

## 2020-01-23 ENCOUNTER — OFFICE VISIT (OUTPATIENT)
Dept: FAMILY MEDICINE CLINIC | Facility: CLINIC | Age: 25
End: 2020-01-23
Payer: COMMERCIAL

## 2020-01-23 ENCOUNTER — TELEPHONE (OUTPATIENT)
Dept: FAMILY MEDICINE CLINIC | Facility: CLINIC | Age: 25
End: 2020-01-23

## 2020-01-23 VITALS
DIASTOLIC BLOOD PRESSURE: 62 MMHG | WEIGHT: 113 LBS | RESPIRATION RATE: 15 BRPM | HEART RATE: 64 BPM | OXYGEN SATURATION: 98 % | BODY MASS INDEX: 19 KG/M2 | SYSTOLIC BLOOD PRESSURE: 114 MMHG

## 2020-01-23 DIAGNOSIS — Z12.4 SCREENING FOR CERVICAL CANCER: ICD-10-CM

## 2020-01-23 DIAGNOSIS — D50.9 IRON DEFICIENCY ANEMIA, UNSPECIFIED IRON DEFICIENCY ANEMIA TYPE: Primary | ICD-10-CM

## 2020-01-23 DIAGNOSIS — Z11.8 SCREENING FOR CHLAMYDIAL DISEASE: ICD-10-CM

## 2020-01-23 DIAGNOSIS — Z23 NEED FOR TUBERCULOSIS VACCINATION: ICD-10-CM

## 2020-01-23 PROCEDURE — 87591 N.GONORRHOEAE DNA AMP PROB: CPT | Performed by: EMERGENCY MEDICINE

## 2020-01-23 PROCEDURE — 86580 TB INTRADERMAL TEST: CPT | Performed by: EMERGENCY MEDICINE

## 2020-01-23 PROCEDURE — 88175 CYTOPATH C/V AUTO FLUID REDO: CPT | Performed by: EMERGENCY MEDICINE

## 2020-01-23 PROCEDURE — 87625 HPV TYPES 16 & 18 ONLY: CPT | Performed by: EMERGENCY MEDICINE

## 2020-01-23 PROCEDURE — 99214 OFFICE O/P EST MOD 30 MIN: CPT | Performed by: EMERGENCY MEDICINE

## 2020-01-23 PROCEDURE — 87624 HPV HI-RISK TYP POOLED RSLT: CPT | Performed by: EMERGENCY MEDICINE

## 2020-01-23 PROCEDURE — 87491 CHLMYD TRACH DNA AMP PROBE: CPT | Performed by: EMERGENCY MEDICINE

## 2020-01-23 NOTE — TELEPHONE ENCOUNTER
Notes recorded by Christian Faria RN on 1/22/2020 at 2:08 PM CST  Pt has appt tomorrow 1/23 with Dr. Evan Berger  ------    Notes recorded by Woodrow Arredondo MD on 1/22/2020 at 12:54 PM CST  Pt is MMR immune    + anemia, suspect iron def.  SHe has had iron def

## 2020-01-23 NOTE — PROGRESS NOTES
Chief Complaint:   Patient presents with:  Gyn Exam: Pap Smear     HPI:   This is a 22year old female     LAB REVIEW   anemia noted on last blood draw. Admits to heavy periods. Lasts for 7 d. Menses regular but heavy. No abd pain.   Denies any family and throat are clear mild pallor of palpebral conjunctival.  EYES: sclera non icteric bilateral  NECK: supple, no adenopathy, no thyromegaly  LUNGS: clear to auscultation, no RRW  CARDIO: RRR without murmur  EXTREMITIES: no cyanosis, clubbing or edema  GI: 6.5 4.0 - 11.0 x10(3) uL    RBC 4.71 3.80 - 5.30 x10(6)uL    HGB 11.1 (L) 12.0 - 16.0 g/dL    HCT 36.6 35.0 - 48.0 %    .0 150.0 - 450.0 10(3)uL    MCV 77.7 (L) 80.0 - 100.0 fL    MCH 23.6 (L) 26.0 - 34.0 pg    MCHC 30.3 (L) 31.0 - 37.0 g/dL    RDW cervical cancer  - THINPREP PAP SMEAR B; Future  - HPV HIGH RISK , THIN PREP COLLECTION;  Future  - THINPREP PAP SMEAR B  - HPV HIGH RISK , THIN PREP COLLECTION  - THINPREP PAP SMEAR ONLY  - HPV 16,18/45 GENOTYPE    3. Screening for chlamydial disease  - [facility] AND [facility]

## 2020-01-24 LAB
C TRACH DNA SPEC QL NAA+PROBE: NEGATIVE
HPV I/H RISK 1 DNA SPEC QL NAA+PROBE: POSITIVE
N GONORRHOEA DNA SPEC QL NAA+PROBE: NEGATIVE

## 2020-01-25 ENCOUNTER — OFFICE VISIT (OUTPATIENT)
Dept: FAMILY MEDICINE CLINIC | Facility: CLINIC | Age: 25
End: 2020-01-25
Payer: COMMERCIAL

## 2020-01-25 DIAGNOSIS — Z11.1 TUBERCULIN SKIN TEST READING ENCOUNTER: Primary | ICD-10-CM

## 2020-01-25 LAB — INDURATION (): 0 MM (ref 0–11)

## 2020-01-26 LAB
HPV16 DNA CVX QL PROBE+SIG AMP: NEGATIVE
HPV18 DNA CVX QL PROBE+SIG AMP: NEGATIVE

## 2020-01-28 PROBLEM — R87.810 CERVICAL HIGH RISK HPV (HUMAN PAPILLOMAVIRUS) TEST POSITIVE: Status: ACTIVE | Noted: 2020-01-28

## 2020-01-29 ENCOUNTER — TELEPHONE (OUTPATIENT)
Dept: FAMILY MEDICINE CLINIC | Facility: CLINIC | Age: 25
End: 2020-01-29

## 2020-01-29 NOTE — TELEPHONE ENCOUNTER
----- Message from Parmjit Valdes MD sent at 1/28/2020  3:49 PM CST -----  PAP neg, But HPV +  Patient needs repeat PAP in 1 year with annual physical

## 2020-01-31 NOTE — TELEPHONE ENCOUNTER
lmom for pt with results/instructions and Asked pt after reviewing message to call office with any questions.

## 2021-03-16 ENCOUNTER — LAB ENCOUNTER (OUTPATIENT)
Dept: LAB | Age: 26
End: 2021-03-16
Attending: NURSE PRACTITIONER

## 2021-03-16 ENCOUNTER — OFFICE VISIT (OUTPATIENT)
Dept: FAMILY MEDICINE CLINIC | Facility: CLINIC | Age: 26
End: 2021-03-16

## 2021-03-16 ENCOUNTER — TELEPHONE (OUTPATIENT)
Dept: FAMILY MEDICINE CLINIC | Facility: CLINIC | Age: 26
End: 2021-03-16

## 2021-03-16 VITALS
SYSTOLIC BLOOD PRESSURE: 120 MMHG | WEIGHT: 115 LBS | BODY MASS INDEX: 20.37 KG/M2 | HEART RATE: 81 BPM | DIASTOLIC BLOOD PRESSURE: 76 MMHG | HEIGHT: 63 IN | TEMPERATURE: 99 F | RESPIRATION RATE: 16 BRPM | OXYGEN SATURATION: 96 %

## 2021-03-16 DIAGNOSIS — E55.9 VITAMIN D DEFICIENCY: ICD-10-CM

## 2021-03-16 DIAGNOSIS — Z00.00 LABORATORY EXAM ORDERED AS PART OF ROUTINE GENERAL MEDICAL EXAMINATION: ICD-10-CM

## 2021-03-16 DIAGNOSIS — Z00.00 ANNUAL PHYSICAL EXAM: Primary | ICD-10-CM

## 2021-03-16 DIAGNOSIS — Z01.818 PRE-OP EXAMINATION: ICD-10-CM

## 2021-03-16 DIAGNOSIS — D50.9 IRON DEFICIENCY ANEMIA, UNSPECIFIED IRON DEFICIENCY ANEMIA TYPE: ICD-10-CM

## 2021-03-16 LAB
ALBUMIN SERPL-MCNC: 4.2 G/DL (ref 3.4–5)
ALBUMIN/GLOB SERPL: 1.2 {RATIO} (ref 1–2)
ALP LIVER SERPL-CCNC: 51 U/L
ALT SERPL-CCNC: 21 U/L
ANION GAP SERPL CALC-SCNC: 3 MMOL/L (ref 0–18)
APTT PPP: 28.2 SECONDS (ref 25.4–36.1)
AST SERPL-CCNC: 14 U/L (ref 15–37)
BASOPHILS # BLD AUTO: 0.03 X10(3) UL (ref 0–0.2)
BASOPHILS NFR BLD AUTO: 0.6 %
BILIRUB SERPL-MCNC: 0.7 MG/DL (ref 0.1–2)
BUN BLD-MCNC: 13 MG/DL (ref 7–18)
BUN/CREAT SERPL: 15.3 (ref 10–20)
CALCIUM BLD-MCNC: 9.1 MG/DL (ref 8.5–10.1)
CHLORIDE SERPL-SCNC: 108 MMOL/L (ref 98–112)
CHOLEST SMN-MCNC: 161 MG/DL (ref ?–200)
CO2 SERPL-SCNC: 27 MMOL/L (ref 21–32)
CREAT BLD-MCNC: 0.85 MG/DL
DEPRECATED HBV CORE AB SER IA-ACNC: 3.4 NG/ML
DEPRECATED RDW RBC AUTO: 46.4 FL (ref 35.1–46.3)
EOSINOPHIL # BLD AUTO: 0.1 X10(3) UL (ref 0–0.7)
EOSINOPHIL NFR BLD AUTO: 1.8 %
ERYTHROCYTE [DISTWIDTH] IN BLOOD BY AUTOMATED COUNT: 14.7 % (ref 11–15)
GLOBULIN PLAS-MCNC: 3.6 G/DL (ref 2.8–4.4)
GLUCOSE BLD-MCNC: 80 MG/DL (ref 70–99)
HCT VFR BLD AUTO: 40.4 %
HDLC SERPL-MCNC: 69 MG/DL (ref 40–59)
HGB BLD-MCNC: 12.5 G/DL
IMM GRANULOCYTES # BLD AUTO: 0.01 X10(3) UL (ref 0–1)
IMM GRANULOCYTES NFR BLD: 0.2 %
INR BLD: 1.05 (ref 0.89–1.11)
IRON SATURATION: 8 %
IRON SERPL-MCNC: 43 UG/DL
LDLC SERPL CALC-MCNC: 82 MG/DL (ref ?–100)
LYMPHOCYTES # BLD AUTO: 2.17 X10(3) UL (ref 1–4)
LYMPHOCYTES NFR BLD AUTO: 40 %
M PROTEIN MFR SERPL ELPH: 7.8 G/DL (ref 6.4–8.2)
MCH RBC QN AUTO: 26.4 PG (ref 26–34)
MCHC RBC AUTO-ENTMCNC: 30.9 G/DL (ref 31–37)
MCV RBC AUTO: 85.2 FL
MONOCYTES # BLD AUTO: 0.41 X10(3) UL (ref 0.1–1)
MONOCYTES NFR BLD AUTO: 7.6 %
NEUTROPHILS # BLD AUTO: 2.7 X10 (3) UL (ref 1.5–7.7)
NEUTROPHILS # BLD AUTO: 2.7 X10(3) UL (ref 1.5–7.7)
NEUTROPHILS NFR BLD AUTO: 49.8 %
NONHDLC SERPL-MCNC: 92 MG/DL (ref ?–130)
OSMOLALITY SERPL CALC.SUM OF ELEC: 285 MOSM/KG (ref 275–295)
PATIENT FASTING Y/N/NP: YES
PATIENT FASTING Y/N/NP: YES
PLATELET # BLD AUTO: 255 10(3)UL (ref 150–450)
POTASSIUM SERPL-SCNC: 4.2 MMOL/L (ref 3.5–5.1)
PSA SERPL DL<=0.01 NG/ML-MCNC: 14 SECONDS (ref 12.4–14.6)
RBC # BLD AUTO: 4.74 X10(6)UL
SODIUM SERPL-SCNC: 138 MMOL/L (ref 136–145)
T4 FREE SERPL-MCNC: 1 NG/DL (ref 0.8–1.7)
TOTAL IRON BINDING CAPACITY: 560 UG/DL (ref 240–450)
TRANSFERRIN SERPL-MCNC: 376 MG/DL (ref 200–360)
TRIGL SERPL-MCNC: 49 MG/DL (ref 30–149)
TSI SER-ACNC: 1.07 MIU/ML (ref 0.36–3.74)
VIT D+METAB SERPL-MCNC: 14 NG/ML (ref 30–100)
VLDLC SERPL CALC-MCNC: 10 MG/DL (ref 0–30)
WBC # BLD AUTO: 5.4 X10(3) UL (ref 4–11)

## 2021-03-16 PROCEDURE — 80050 GENERAL HEALTH PANEL: CPT | Performed by: NURSE PRACTITIONER

## 2021-03-16 PROCEDURE — 82306 VITAMIN D 25 HYDROXY: CPT | Performed by: NURSE PRACTITIONER

## 2021-03-16 PROCEDURE — 93000 ELECTROCARDIOGRAM COMPLETE: CPT | Performed by: NURSE PRACTITIONER

## 2021-03-16 PROCEDURE — 83540 ASSAY OF IRON: CPT | Performed by: NURSE PRACTITIONER

## 2021-03-16 PROCEDURE — 3078F DIAST BP <80 MM HG: CPT | Performed by: NURSE PRACTITIONER

## 2021-03-16 PROCEDURE — 83550 IRON BINDING TEST: CPT | Performed by: NURSE PRACTITIONER

## 2021-03-16 PROCEDURE — 80061 LIPID PANEL: CPT | Performed by: NURSE PRACTITIONER

## 2021-03-16 PROCEDURE — 85610 PROTHROMBIN TIME: CPT | Performed by: NURSE PRACTITIONER

## 2021-03-16 PROCEDURE — 85730 THROMBOPLASTIN TIME PARTIAL: CPT | Performed by: NURSE PRACTITIONER

## 2021-03-16 PROCEDURE — 3008F BODY MASS INDEX DOCD: CPT | Performed by: NURSE PRACTITIONER

## 2021-03-16 PROCEDURE — 82728 ASSAY OF FERRITIN: CPT | Performed by: NURSE PRACTITIONER

## 2021-03-16 PROCEDURE — 84439 ASSAY OF FREE THYROXINE: CPT | Performed by: NURSE PRACTITIONER

## 2021-03-16 PROCEDURE — 99395 PREV VISIT EST AGE 18-39: CPT | Performed by: NURSE PRACTITIONER

## 2021-03-16 PROCEDURE — 3074F SYST BP LT 130 MM HG: CPT | Performed by: NURSE PRACTITIONER

## 2021-03-16 NOTE — PROGRESS NOTES
Dionicio Humphries is a 26year old female who presents for a pre-operative physical exam.     Dionicio Humphries is scheduled for a bilateral breast augmentation procedure at [place] [address] on 04/06/21 performed by  chills and fever. HENT: Negative for ear discharge, ear pain, sinus pain and sore throat. Eyes: Negative for photophobia. Respiratory: Negative for cough, shortness of breath and wheezing.     Cardiovascular: Negative for chest pain, palpitations and Mood and Affect: Mood normal.         Behavior: Behavior normal.         Thought Content:  Thought content normal.         Judgment: Judgment normal.       LABORATORY DATA:     [facility] Lab Encounter on 03/16/2021   Component Date Value Ref Range Status   • mIU/mL Final   • Iron 03/16/2021 43* 50 - 170 ug/dL Final   • Transferrin 03/16/2021 376* 200 - 360 mg/dL Final   • Total Iron Binding Capacity 03/16/2021 560* 240 - 450 ug/dL Final   • % Saturation 03/16/2021 8* 15 - 50 % Final   • Ferritin 03/16/2021 3.4 METABOLIC PANEL (14); Future  - PROTHROMBIN TIME (PT)  - PTT, ACTIVATED    3. Laboratory exam ordered as part of routine general medical examination  - LIPID PANEL  - TSH+FREE T4    4.  Iron deficiency anemia, unspecified iron deficiency anemia type  - IRON

## 2021-03-18 ENCOUNTER — TELEPHONE (OUTPATIENT)
Dept: FAMILY MEDICINE CLINIC | Facility: CLINIC | Age: 26
End: 2021-03-18

## 2021-03-18 DIAGNOSIS — D50.9 IRON DEFICIENCY ANEMIA, UNSPECIFIED IRON DEFICIENCY ANEMIA TYPE: ICD-10-CM

## 2021-03-18 DIAGNOSIS — E55.9 VITAMIN D DEFICIENCY: Primary | ICD-10-CM

## 2021-03-18 RX ORDER — ERGOCALCIFEROL 1.25 MG/1
50000 CAPSULE ORAL WEEKLY
Qty: 12 CAPSULE | Refills: 0 | Status: SHIPPED | OUTPATIENT
Start: 2021-03-18 | End: 2021-04-17

## 2021-03-18 NOTE — TELEPHONE ENCOUNTER
----- Message from YESI Rosenberg FNP-C sent at 3/18/2021 12:25 PM CDT -----  Iron deficiency. Was previously taking iron supplement, but stopped. Recommend re-starting OTC iron daily. Colace prn. Repeat iron studies in 6 months. Vitamin D is low.  P

## 2021-03-22 ENCOUNTER — TELEPHONE (OUTPATIENT)
Dept: FAMILY MEDICINE CLINIC | Facility: CLINIC | Age: 26
End: 2021-03-22

## 2021-03-22 NOTE — TELEPHONE ENCOUNTER
Have you received CXR report from [facility]? See previous TE. Waiting on x-ray report from [facility].

## 2021-03-22 NOTE — TELEPHONE ENCOUNTER
Pt called requesting labs and ekg be sent to   [address] [facility] surgery at fax: [phone].

## 2021-03-23 ENCOUNTER — MED REC SCAN ONLY (OUTPATIENT)
Dept: FAMILY MEDICINE CLINIC | Facility: CLINIC | Age: 26
End: 2021-03-23

## 2021-03-23 ENCOUNTER — TELEPHONE (OUTPATIENT)
Dept: FAMILY MEDICINE CLINIC | Facility: CLINIC | Age: 26
End: 2021-03-23

## 2021-03-23 NOTE — TELEPHONE ENCOUNTER
Medical clearance complete and all documentation faxed to [address] [facility] faxed [phone].

## 2021-05-27 ENCOUNTER — OFFICE VISIT (OUTPATIENT)
Dept: FAMILY MEDICINE CLINIC | Facility: CLINIC | Age: 26
End: 2021-05-27

## 2021-05-27 VITALS
DIASTOLIC BLOOD PRESSURE: 82 MMHG | SYSTOLIC BLOOD PRESSURE: 122 MMHG | WEIGHT: 116 LBS | TEMPERATURE: 98 F | OXYGEN SATURATION: 98 % | HEART RATE: 76 BPM | BODY MASS INDEX: 21 KG/M2 | RESPIRATION RATE: 15 BRPM

## 2021-05-27 DIAGNOSIS — T81.31XA POSTOPERATIVE WOUND DEHISCENCE, INITIAL ENCOUNTER: Primary | ICD-10-CM

## 2021-05-27 PROCEDURE — 99214 OFFICE O/P EST MOD 30 MIN: CPT | Performed by: EMERGENCY MEDICINE

## 2021-05-27 PROCEDURE — 3074F SYST BP LT 130 MM HG: CPT | Performed by: EMERGENCY MEDICINE

## 2021-05-27 PROCEDURE — 3079F DIAST BP 80-89 MM HG: CPT | Performed by: EMERGENCY MEDICINE

## 2021-05-27 RX ORDER — CEPHALEXIN 500 MG/1
500 CAPSULE ORAL 4 TIMES DAILY
Qty: 28 CAPSULE | Refills: 0 | Status: SHIPPED | OUTPATIENT
Start: 2021-05-27 | End: 2021-06-03

## 2021-05-27 NOTE — PATIENT INSTRUCTIONS
Thank you for choosing [facility]  To Do:  FOR SHANIA Ledesma        1. Continue with steristrips  2. Follow up with surgery  3. Take antibiotics as directed        [facility]    Dr. Judy Choe    10 TR[address].  Hilda Flowers# wet it a little to remove it, unless your healthcare provider told you not to wet it. · Wash your hands again before putting on a new, clean dressing. · Gently clean the wound with clean water (or saline) using gauze or a clean washcloth.  Don't rub it or · If a culture was done, you will be told if the results will affect your treatment. You can call as directed for the results.   · If imaging tests, such as X-rays, an ultrasound, or CT scan were done, they will be looked at by a specialist. Damián Melgar will be t

## 2021-05-27 NOTE — PROGRESS NOTES
Chief Complaint:   Patient presents with:  Breast Problem: Possible breast infection after breast augmentation surgery 4/6    HPI:   This is a 23year old female     WOUND PROBLEM    S/P breast augmentation April 6th through Dr. Charlyn Sever  Postoperatively pa turgor, no obvious rashes  HEENT: atraumatic, normocephalic, ears, nose and throat are clear  EYES: sclera non icteric bilateral  NECK: supple, no adenopathy, no thyromegaly  BREAST: Around both areolar areas there is a crescent shape incision.   Incision s

## 2021-05-28 ENCOUNTER — TELEPHONE (OUTPATIENT)
Dept: FAMILY MEDICINE CLINIC | Facility: CLINIC | Age: 26
End: 2021-05-28

## 2021-05-28 NOTE — TELEPHONE ENCOUNTER
Imer Flores  [facility]  Good morning,     Please advise if this patient is self pay or keys coverage.        Thank you     Bianca Espinoza

## 2021-06-07 ENCOUNTER — HOSPITAL ENCOUNTER (EMERGENCY)
Facility: HOSPITAL | Age: 26
Discharge: HOME | End: 2021-06-08
Attending: EMERGENCY MEDICINE

## 2021-06-07 DIAGNOSIS — T81.30XA WOUND DEHISCENCE: Primary | ICD-10-CM

## 2021-06-07 PROCEDURE — 99282 EMERGENCY DEPT VISIT SF MDM: CPT

## 2021-06-08 VITALS
RESPIRATION RATE: 20 BRPM | DIASTOLIC BLOOD PRESSURE: 87 MMHG | BODY MASS INDEX: 20 KG/M2 | OXYGEN SATURATION: 98 % | WEIGHT: 115 LBS | HEART RATE: 59 BPM | SYSTOLIC BLOOD PRESSURE: 118 MMHG | TEMPERATURE: 98 F

## 2021-06-08 NOTE — ED INITIAL ASSESSMENT (HPI)
Patient had breast augmentation surgery two months ago   Patient notes incision site opening on right breast.  Denies fevers. Saw breast surgeon last week who saw site.

## 2021-06-08 NOTE — ED PROVIDER NOTES
Patient Seen in: [facility] AND [facility] Emergency Department      History   Patient presents with:  Breast Problem    Stated Complaint: post op problem    HPI/Subjective:   HPI  Patient is a 21-year-old female history of breast augmentation surgery performed are equal, round, and reactive to light. Cardiovascular:      Rate and Rhythm: Normal rate and regular rhythm. Pulmonary:      Effort: Pulmonary effort is normal. No respiratory distress. Breath sounds: Normal breath sounds.    Abdominal:      Gene   [address] [facility]    Schedule an appointment as soon as possible for a visit            Medications Prescribed:  There are no discharge medications for this patient.

## 2021-06-09 ENCOUNTER — TELEPHONE (OUTPATIENT)
Dept: SURGERY | Facility: CLINIC | Age: 26
End: 2021-06-09

## 2021-06-11 NOTE — TELEPHONE ENCOUNTER
Calling pt in regards to her recent visit to the ER and referral to Dr. Tiff Rodgers. No answer. LVM after verifying verbal release. Informed pt that Dr. Tiff Rodgers is not the appropriate provider for her current problem related to her breast augmentation surgery.

## 2021-12-10 ENCOUNTER — TELEPHONE (OUTPATIENT)
Dept: FAMILY MEDICINE CLINIC | Facility: CLINIC | Age: 26
End: 2021-12-10

## 2021-12-10 ENCOUNTER — OFFICE VISIT (OUTPATIENT)
Dept: FAMILY MEDICINE CLINIC | Facility: CLINIC | Age: 26
End: 2021-12-10

## 2021-12-10 VITALS
HEIGHT: 63 IN | SYSTOLIC BLOOD PRESSURE: 122 MMHG | WEIGHT: 120 LBS | HEART RATE: 65 BPM | DIASTOLIC BLOOD PRESSURE: 74 MMHG | RESPIRATION RATE: 16 BRPM | BODY MASS INDEX: 21.26 KG/M2 | OXYGEN SATURATION: 97 %

## 2021-12-10 DIAGNOSIS — R10.31 RIGHT LOWER QUADRANT ABDOMINAL PAIN: Primary | ICD-10-CM

## 2021-12-10 PROCEDURE — 3078F DIAST BP <80 MM HG: CPT | Performed by: EMERGENCY MEDICINE

## 2021-12-10 PROCEDURE — 3074F SYST BP LT 130 MM HG: CPT | Performed by: EMERGENCY MEDICINE

## 2021-12-10 PROCEDURE — 87086 URINE CULTURE/COLONY COUNT: CPT | Performed by: EMERGENCY MEDICINE

## 2021-12-10 PROCEDURE — 81003 URINALYSIS AUTO W/O SCOPE: CPT | Performed by: EMERGENCY MEDICINE

## 2021-12-10 PROCEDURE — 3008F BODY MASS INDEX DOCD: CPT | Performed by: EMERGENCY MEDICINE

## 2021-12-10 PROCEDURE — 99214 OFFICE O/P EST MOD 30 MIN: CPT | Performed by: EMERGENCY MEDICINE

## 2021-12-10 PROCEDURE — 81025 URINE PREGNANCY TEST: CPT | Performed by: EMERGENCY MEDICINE

## 2021-12-10 NOTE — PATIENT INSTRUCTIONS
Thank you for choosing [facility]  To Do:  FOR SHANIA Wong        1. Have blood tests done  2. Arrange for CT scan  3. Trial of taking Prevacid daily for 6 weeks  4.  May take over the counter Maalox or mylanta or TUMS for abd disco placed on the abdomen may help relieve pain. · Something warm on your abdomen (such as a heating pad) may help, but be careful not to burn yourself. Diet  · Don’t force yourself to eat, especially if having cramps, vomiting, or diarrhea.   · Water is impo are getting dehydrated  Donte last reviewed this educational content on 6/1/2018  © 7650-6551 [employer]. All rights reserved. This information is not intended as a substitute for professional medical care.  Always follow your healthcare p

## 2021-12-10 NOTE — PROGRESS NOTES
Chief Complaint:   Patient presents with:  Abdominal Pain: C/o abdominal pain mostly after meals x2-3 weeks. HPI:   This is a 23year old female     ABDOMINAL PAIN  Abdominal pain x 2-3 weeks. Pain on and off.  Pain periumbilical that radiates to lowe groomed.   GENERAL: well developed, well nourished, well hydrated, no distress  SKIN: good skin turgor, no obvious rashes  HEENT: atraumatic, normocephalic, ears, nose and throat are clear  EYES: sclera non icteric bilateral  NECK: supple, no adenopathy, no today with a history of abdominal pain. Gestures and points to the right lower quadrants being tender. No evidence for acute abdomen. Await labs. CT scan to rule out appendicitis also ordered stat. ER precautions given.       PATIENT INSTRUCTIONS:    1

## 2021-12-10 NOTE — TELEPHONE ENCOUNTER
Patient states that she does not have insurance. Will pay out of pocket. Planning on going to [facility].

## 2021-12-12 ENCOUNTER — PATIENT MESSAGE (OUTPATIENT)
Dept: FAMILY MEDICINE CLINIC | Facility: CLINIC | Age: 26
End: 2021-12-12

## 2021-12-13 NOTE — TELEPHONE ENCOUNTER
Pt sent message stating she was informed she has a urine infection but not informed of which type of infection she has. Please advise. Thank you.    LOV: 12/10/21

## 2021-12-13 NOTE — TELEPHONE ENCOUNTER
From: Arnaldo Mariee  To: Lowell Reese MD  Sent: 12/12/2021 11:55 PM CST  Subject: Question regarding URINE CULTURE, ROUTINE    I was informed of this positive infection result in the urine sample, but was not informed on what the infectio

## 2022-01-04 ENCOUNTER — TELEPHONE (OUTPATIENT)
Dept: FAMILY MEDICINE CLINIC | Facility: CLINIC | Age: 27
End: 2022-01-04

## 2022-01-04 NOTE — TELEPHONE ENCOUNTER
Patient requesting to have orders for labs and CT scan that was placed on 12/10 to be faxed to [facility] in [place].    Patient states that she does have printed copies of the lab and CT orders; however, [facility] would not accept the print

## 2022-01-05 NOTE — TELEPHONE ENCOUNTER
Notified the patient of the below CT results and recommendation. Patient verbalized understanding. Has a F/U on 1/7 with PCP. Will keep the appointment. Answered all questions at this time.

## 2022-01-05 NOTE — TELEPHONE ENCOUNTER
Pt requesting a detailed message with results and whether or not she can eat as she was advised not to eat until her pcp told her it was ok.

## 2022-01-05 NOTE — TELEPHONE ENCOUNTER
Patient calling for STAT CT results that was done at [facility] today in [facility]

## 2022-01-07 ENCOUNTER — TELEPHONE (OUTPATIENT)
Dept: FAMILY MEDICINE CLINIC | Facility: CLINIC | Age: 27
End: 2022-01-07

## 2022-01-07 DIAGNOSIS — R10.31 RIGHT LOWER QUADRANT ABDOMINAL PAIN: Primary | ICD-10-CM

## 2022-01-07 NOTE — TELEPHONE ENCOUNTER
CT scan done at [facility] reviewed  No acute findings on CT scan  No appendicitis  If patient still having pain, pls refer to GI, Dr. Varun Torres    CT results sent to scanning

## 2022-01-10 NOTE — TELEPHONE ENCOUNTER
Dr. Lucy Moore made aware of lab results and states labs are stable. [facility] message sent to pt.

## 2022-01-10 NOTE — TELEPHONE ENCOUNTER
Called pt and was informed of CT results and below recommendations from provider. Pt states she is doing a trial of eating a gluten free and symptoms have gone away. Pt then requesting for a \"biopsy\" to be completed.  Informed pt she will have to f/u with

## 2022-01-10 NOTE — TELEPHONE ENCOUNTER
Received CBC and CMP but not amylase and lipase. Called [facility], talked with [facility] and was made aware amylase and lipase was not received. Asked if pt had this completed in which Génesis said yes.  Génesis clarified if we received 2 pages for th

## 2022-01-21 ENCOUNTER — PATIENT MESSAGE (OUTPATIENT)
Dept: FAMILY MEDICINE CLINIC | Facility: CLINIC | Age: 27
End: 2022-01-21

## 2022-01-21 NOTE — TELEPHONE ENCOUNTER
Please see pt e-mail & advise on abnormal lab results, labs are scanned in & resulted as stable, pt is concerned. Please advise. Thank you.

## 2022-01-21 NOTE — TELEPHONE ENCOUNTER
From: Joaquina Sheriff  To: Memo Castro MD  Sent: 1/21/2022 11:51 AM CST  Subject: Question regarding Lab Result    May I ask what the abnormal summary results are?  Thank you so much!!

## 2022-09-13 NOTE — PATIENT INSTRUCTIONS
Thank you for choosing [facility]  To Do:  FOR SHANIA ALMONTE    · Take OTC Ferrous sulfate 325 mg 2-3 times a day.  Anticipate constipation and stomach upset.  Advise patient to increase oral fluids, increase dietary fiber with fruits normal...

## 2023-07-11 NOTE — TELEPHONE ENCOUNTER
Received STAT CT results. PCP not in the office today. Will have one of the other providers review results. Maryann

## (undated) NOTE — LETTER
Date: 1/14/2019    Patient Name: Anny James          To Whom it may concern: The above patient was seen at the [facility] for treatment of a medical condition. This patient should be excused from attending work 01/14/19.

## (undated) NOTE — LETTER
Date: 7/27/2018    Patient Name: Hood Estes          To Whom it may concern: This letter has been written at the patient's request. The above patient was seen at the [facility] for treatment of a medical condition.     This

## (undated) NOTE — LETTER
Date: 3/25/2019    Patient Name: Heather Tapia          To Whom it may concern: This letter has been written at the patient's request. The above patient was seen at the [facility] for treatment of a medical condition.     This

## (undated) NOTE — MR AVS SNAPSHOT
[facility]  [address] [place] [id number]               Thank you for choosing us for your health care visit with Sebastien Ceron MD.  We are glad to serve you and happy to provide you with this sum

## (undated) NOTE — LETTER
Date: 11/6/2018    Patient Name: Norma Silva          To Whom it may concern: This letter has been written at the patient's request. The above patient was seen at the [facility] for treatment of a medical condition.     This

## (undated) NOTE — MR AVS SNAPSHOT
[address]  [address]  [address] [id number]  [phone]               Thank you for choosing us for your health care visit with Armin Morales MD.  We are glad to serve you and happy to provide you with this summary of toothbrush after 2-3 days of antibiotics. He may take over-the-counter Tylenol or Motrin for fever body aches and chills.   To ER if worse like increased pain, difficulty breathing weakness vommitting shortness of breath etc.    Have blood tests done after improving over the next week.   When to seek medical advice  Call your healthcare provider right away if any of these occur:  · Fever as directed by your doctor   · New or worsening ear pain, sinus pain, or headache  · Painful lumps in the back of neck  · S

## (undated) NOTE — LETTER
Date: 10/1/2019    Patient Name: Mildred Gonzalez          To Whom it may concern: This letter has been written at the patient's request. The above patient was seen at the [facility] for treatment of a medical condition.     The p

## (undated) NOTE — LETTER
Date: 8/25/2017    Patient Name: Jasmine Story          To Whom it may concern: This letter has been written at the patient's request. The above patient was seen at the [facility] for treatment of a medical condition.     Deepa

## (undated) NOTE — MR AVS SNAPSHOT
[address]  [address] [facility]. [address] [id number] [phone]               Thank you for choosing us for your health care visit with Wendy Cross MD.  We are glad to serve you and happy to provide you with this summary of Thank you for choosing [facility]  To Do:  FOR SHANIA Jalloht Jason  1. Continue with Metrogel for now  2. Take antibiotic, Ciprofloxacin as directed  3. You are anemic, take OTC Ferrous Sulfate 2-3 times a day  4.  Take high dose vit D 5000 to high altitudes. Follow-up care  Follow up with your healthcare provider in 2 months, or as advised. This is to have another red blood cell count to be sure your anemia has been fixed.   When to seek medical advice  Call your healthcare provider right aw anti-inflammatory medicines. These include ibuprofen and naproxen. These thin the blood and may increase bleeding. Follow-up care  Follow up with your healthcare provider, or as advised.  If you were injured and had blood in your urine, you should have a r - [address] AT [facility], [phone], [address] [facility] [address]    Hours:  24-hours Phone:  [phone]    - Ciprofloxacin HCl 500 MG Tabs  - ValACYclovir HCl 1 G Tabs               Visit [facility]